# Patient Record
Sex: MALE | Race: WHITE | NOT HISPANIC OR LATINO | Employment: UNEMPLOYED | URBAN - METROPOLITAN AREA
[De-identification: names, ages, dates, MRNs, and addresses within clinical notes are randomized per-mention and may not be internally consistent; named-entity substitution may affect disease eponyms.]

---

## 2020-08-12 ENCOUNTER — OFFICE VISIT (OUTPATIENT)
Dept: INTERNAL MEDICINE | Facility: CLINIC | Age: 45
End: 2020-08-12
Payer: MEDICAID

## 2020-08-12 VITALS
HEART RATE: 92 BPM | TEMPERATURE: 98 F | HEIGHT: 71 IN | DIASTOLIC BLOOD PRESSURE: 79 MMHG | WEIGHT: 177.81 LBS | BODY MASS INDEX: 24.89 KG/M2 | SYSTOLIC BLOOD PRESSURE: 123 MMHG

## 2020-08-12 DIAGNOSIS — R53.81 MALAISE AND FATIGUE: ICD-10-CM

## 2020-08-12 DIAGNOSIS — G89.29 CHRONIC LEFT SHOULDER PAIN: Primary | ICD-10-CM

## 2020-08-12 DIAGNOSIS — F41.9 ANXIETY: ICD-10-CM

## 2020-08-12 DIAGNOSIS — N52.9 ERECTILE DYSFUNCTION, UNSPECIFIED ERECTILE DYSFUNCTION TYPE: ICD-10-CM

## 2020-08-12 DIAGNOSIS — E55.9 VITAMIN D DEFICIENCY: ICD-10-CM

## 2020-08-12 DIAGNOSIS — M54.9 UPPER BACK PAIN: ICD-10-CM

## 2020-08-12 DIAGNOSIS — R53.83 MALAISE AND FATIGUE: ICD-10-CM

## 2020-08-12 DIAGNOSIS — M25.512 CHRONIC LEFT SHOULDER PAIN: Primary | ICD-10-CM

## 2020-08-12 DIAGNOSIS — R63.4 WEIGHT LOSS: ICD-10-CM

## 2020-08-12 DIAGNOSIS — Z87.891 FORMER LIGHT TOBACCO SMOKER: ICD-10-CM

## 2020-08-12 DIAGNOSIS — Z00.00 ENCOUNTER FOR WELLNESS EXAMINATION IN ADULT: ICD-10-CM

## 2020-08-12 DIAGNOSIS — Z12.5 SPECIAL SCREENING, PROSTATE CANCER: ICD-10-CM

## 2020-08-12 PROBLEM — M79.604 PAIN IN BOTH LOWER EXTREMITIES: Status: ACTIVE | Noted: 2020-08-12

## 2020-08-12 PROBLEM — M79.605 PAIN IN BOTH LOWER EXTREMITIES: Status: ACTIVE | Noted: 2020-08-12

## 2020-08-12 PROCEDURE — 99204 OFFICE O/P NEW MOD 45 MIN: CPT | Mod: S$GLB,,, | Performed by: INTERNAL MEDICINE

## 2020-08-12 PROCEDURE — 99204 PR OFFICE/OUTPT VISIT, NEW, LEVL IV, 45-59 MIN: ICD-10-PCS | Mod: S$GLB,,, | Performed by: INTERNAL MEDICINE

## 2020-08-12 NOTE — PROGRESS NOTES
Chief C/o:    Headache (New patient) and Muscle Pain (Bilateral Legs x one mth, Bilateral Arms)        Health Care Maintenance    Health Maintenance       Date Due Completion Date    Hepatitis C Screening 1975 ---    Lipid Panel 1974 ---    HIV Screening 09/14/1989 ---    TETANUS VACCINE 09/14/1992 ---    Influenza Vaccine (1) 09/01/2020 ---                 HISTORY OF PRESENT ILLNESS:    PETER Houston is a 44 y.o. male who presents to the clinic today for Headache (New patient) and Muscle Pain (Bilateral Legs x one mth, Bilateral Arms)  .  Patient is coming to be established as a new patient.  He is telling me that he has no medical problem and no medical complaint till about January of this year.  He was working on his car repairing it in the cold weather with no other help, he was lifting a heavy object in the car while he was lying down under it for a prolonged period of time while he screwing some screws to fix something in the bottle the car, and after that he felt some mild pain in his neck and shoulders but was not too much.  After about 3 days patient started to have severe pain and numbness in the shoulders and neck and upper back, and he still have pain in the left shoulder in particular, and he feels is his power strength is decreasing there, and he is occasionally having numbness in the left side of the upper back and the left shoulder and left arm.  Patient also started to have pains in both his legs, mainly in the thighs and knees, with no recent trauma.  Patient so multiple doctors or have money treatments but it did not work, he was seen in the emergency room a couple of months ago at [facility] [place], and less than a month ago at [facility], he thinks that the are unable to diagnose him and know what is wrong with him.  Patient have some ED since he started to have his left shoulder and upper back pain i.e. about 5-6 months or more.  Patient feels that he is having a lot  "of anxiety at this time, because of the upper complain as well as his frustration that nobody seems to be able to diagnose him and treat him, to get rid of the symptoms.  Patient so far is not convinced that anybody saw him in the past was able from what is wrong with him.  He is not accepting that his symptoms could be simply because of anxiety.  He is feeling tired and fatigued, all his appetite is not as good as it was, and he is telling me that he lost about 10 kg over the past 7-8 months as well because of the loss of appetite.  He said he is to be about 90 kg and now he is about 80.              ALLERGIES AND MEDICATIONS: updated and reviewed.  Review of patient's allergies indicates:   Allergen Reactions    Acetaminophen Other (See Comments) and Palpitations     "feels like I was going to die"               CARE TEAM:    Patient Care Team:  Isidro Cuadra MD as PCP - General (Internal Medicine)         REVIEW OF SYSTEMS:    Review of Systems   Constitutional: Positive for fatigue and unexpected weight change (Mostly because of loss of appetite and not eating as well as before.). Negative for appetite change, chills, diaphoresis and fever.   HENT: Negative for congestion, drooling, ear discharge, ear pain, facial swelling, hearing loss, nosebleeds, rhinorrhea, sinus pain, sneezing, sore throat, tinnitus, trouble swallowing and voice change.    Eyes: Negative for pain, discharge, redness, itching and visual disturbance.   Respiratory: Negative for cough, choking, chest tightness, shortness of breath, wheezing and stridor.    Cardiovascular: Negative for chest pain, palpitations and leg swelling.   Gastrointestinal: Negative for abdominal distention, abdominal pain, blood in stool, constipation, diarrhea, nausea and vomiting.   Endocrine: Negative for cold intolerance, heat intolerance, polydipsia, polyphagia and polyuria.   Genitourinary: Negative for difficulty urinating, dysuria, flank pain, " "frequency, hematuria and urgency.   Musculoskeletal: Negative for arthralgias, back pain, gait problem, joint swelling, myalgias, neck pain and neck stiffness.   Skin: Negative for color change, pallor, rash and wound.   Allergic/Immunologic: Negative for environmental allergies, food allergies and immunocompromised state.   Neurological: Negative for dizziness, tremors, seizures, syncope, speech difficulty, weakness, light-headedness, numbness and headaches.   Hematological: Negative for adenopathy. Does not bruise/bleed easily.   Psychiatric/Behavioral: Negative for agitation, behavioral problems, confusion, decreased concentration, dysphoric mood, hallucinations, sleep disturbance and suicidal ideas. The patient is not nervous/anxious.          PHYSICAL EXAM:    Vitals:    08/12/20 1501   BP: 123/79   Pulse: 92   Temp: 97.9 °F (36.6 °C)     Weight: 80.7 kg (177 lb 12.8 oz)   Height: 5' 10.87" (180 cm)   Body mass index is 24.89 kg/m².  Vitals:    08/12/20 1501   BP: 123/79   Pulse: 92   Temp: 97.9 °F (36.6 °C)   Weight: 80.7 kg (177 lb 12.8 oz)   Height: 5' 10.87" (1.8 m)          Physical Exam   Constitutional: He is oriented to person, place, and time. He appears well-developed and well-nourished.  Non-toxic appearance. He does not appear ill. No distress.   Patient is anxious, comfortable, no gross distress, seems to be emotional, he showing signs of frustration as well.   HENT:   Head: Normocephalic and atraumatic.   Right Ear: Tympanic membrane, external ear and ear canal normal.   Left Ear: Tympanic membrane, external ear and ear canal normal.   Nose: Nose normal. No rhinorrhea or nasal congestion.   Mouth/Throat: Oropharynx is clear and moist. Mucous membranes are moist. No oropharyngeal exudate or posterior oropharyngeal erythema. Oropharynx is clear.   Eyes: Pupils are equal, round, and reactive to light. Conjunctivae are normal. Right eye exhibits no discharge. Left eye exhibits no discharge. No scleral " icterus.   Neck: Normal range of motion. Neck supple. No JVD present. No muscular tenderness present. No neck rigidity. No tracheal deviation present. No thyromegaly present.   Cardiovascular: Normal rate, regular rhythm, normal heart sounds and normal pulses. Exam reveals no gallop and no friction rub.   No murmur heard.  Pulmonary/Chest: Effort normal and breath sounds normal. No stridor. No respiratory distress. He has no wheezes. He has no rhonchi. He has no rales. He exhibits no tenderness.   Abdominal: Soft. Bowel sounds are normal. He exhibits no distension and no mass. There is no abdominal tenderness. There is no rebound and no guarding. No hernia.   Genitourinary:    Genitourinary Comments: No costovertebral angle tenderness.     Musculoskeletal: Normal range of motion.         General: Tenderness (Mild tenderness on palpating left upper back, and left shoulder.) present. No swelling, deformity or signs of injury.      Right lower leg: No edema.      Left lower leg: No edema.   Lymphadenopathy:     He has no cervical adenopathy.   Neurological: He is alert and oriented to person, place, and time. He displays no weakness and normal reflexes. No cranial nerve deficit or sensory deficit. He exhibits normal muscle tone. Coordination normal.   Skin: Skin is warm and dry. Capillary refill takes less than 2 seconds. No bruising, no lesion and no rash noted. He is not diaphoretic. No erythema. No jaundice or pallor.   Psychiatric: His behavior is normal. Mood, judgment and thought content normal.   Nursing note and vitals reviewed.         Labs:    February, had a visit to [facility], he had a CBC which was normal, CMP was normal except for blood sugar which was elevated to 118.  Was complaining of left shoulder pain and palpitation.      ASSESSMENT & PLAN:    1. Chronic left shoulder pain  - CREATININE, SERUM; Future  - Sedimentation rate; Future  - FERNANDO Screen w/Reflex; Future  - Rheumatoid  factor; Future  - CREATININE, SERUM  - Sedimentation rate  - FERNANDO Screen w/Reflex  - Rheumatoid factor    2. Upper back pain  - CREATININE, SERUM; Future  - Sedimentation rate; Future  - FERNANDO Screen w/Reflex; Future  - Rheumatoid factor; Future  - CREATININE, SERUM  - Sedimentation rate  - FERNANDO Screen w/Reflex  - Rheumatoid factor    3. Vitamin D deficiency  - Vitamin D; Future  - Vitamin D    4. Anxiety    5. Malaise and fatigue    6. Weight loss    7. Erectile dysfunction, unspecified erectile dysfunction type    8. BMI 24.0-24.9, adult    9. Encounter for wellness examination in adult  - CBC auto differential; Future  - Comprehensive metabolic panel; Future  - Lipid Panel; Future  - TSH; Future  - Hemoglobin A1C; Future  - CBC auto differential  - Comprehensive metabolic panel  - Lipid Panel  - TSH  - Hemoglobin A1C    10. Former light tobacco smoker    11. Special screening, prostate cancer  - Testosterone, free; Future  - Testosterone, free     Discussion:  Patient with history of musculoskeletal pains mainly in the upper back and left shoulder, and now some musculoskeletal pains in the lower extremities, and this seems to be aggravated by anxiety and frustration.  I tried to explain to the patient in simple words the nature of his complains, that his musculoskeletal pains are real, and that he has in addition to that anxiety and because of his frustration and a diagnosis to his satisfaction was not reached.  I explained him that I will start with blood testing, and I will see him after that and then will go from there.  I am not prescribing any medication for him at this time.    Orders Placed This Encounter   Procedures    CBC auto differential    Comprehensive metabolic panel    Lipid Panel    TSH    Hemoglobin A1C    Testosterone, free    Vitamin D    CREATININE, SERUM    Sedimentation rate    FERNANDO Screen w/Reflex    Rheumatoid factor      Follow up in about 10 days (around 8/22/2020). or sooner as  needed.    Patient was counseled and questions and concerns were addressed.    Please note:  Parts of this report were done using a dictation software, voice to text, and sometimes the text contains some uncorrected words or sentences that are missed during revision.

## 2020-08-17 LAB
25(OH)D3+25(OH)D2 SERPL-MCNC: 18.2 NG/ML (ref 30–100)
ALBUMIN SERPL-MCNC: 4.8 G/DL (ref 4–5)
ALBUMIN/GLOB SERPL: 2.1 {RATIO} (ref 1.2–2.2)
ALP SERPL-CCNC: 60 IU/L (ref 39–117)
ALT SERPL-CCNC: 17 IU/L (ref 0–44)
AST SERPL-CCNC: 12 IU/L (ref 0–40)
BASOPHILS # BLD AUTO: 0 X10E3/UL (ref 0–0.2)
BASOPHILS NFR BLD AUTO: 0 %
BILIRUB SERPL-MCNC: 0.9 MG/DL (ref 0–1.2)
BUN SERPL-MCNC: 16 MG/DL (ref 6–24)
BUN/CREAT SERPL: 16 (ref 9–20)
CALCIUM SERPL-MCNC: 9.7 MG/DL (ref 8.7–10.2)
CHLORIDE SERPL-SCNC: 103 MMOL/L (ref 96–106)
CHOLEST SERPL-MCNC: 181 MG/DL (ref 100–199)
CO2 SERPL-SCNC: 23 MMOL/L (ref 20–29)
CREAT SERPL-MCNC: 1 MG/DL (ref 0.76–1.27)
EOSINOPHIL # BLD AUTO: 0.1 X10E3/UL (ref 0–0.4)
EOSINOPHIL NFR BLD AUTO: 2 %
ERYTHROCYTE [DISTWIDTH] IN BLOOD BY AUTOMATED COUNT: 13.1 % (ref 11.6–15.4)
GLOBULIN SER CALC-MCNC: 2.3 G/DL (ref 1.5–4.5)
GLUCOSE SERPL-MCNC: 100 MG/DL (ref 65–99)
HBA1C MFR BLD: 5.5 % (ref 4.8–5.6)
HCT VFR BLD AUTO: 43.8 % (ref 37.5–51)
HDLC SERPL-MCNC: 45 MG/DL
HGB BLD-MCNC: 14.6 G/DL (ref 13–17.7)
IMM GRANULOCYTES # BLD AUTO: 0 X10E3/UL (ref 0–0.1)
IMM GRANULOCYTES NFR BLD AUTO: 0 %
INTERPRETATION: NORMAL
LDLC SERPL CALC-MCNC: 117 MG/DL (ref 0–99)
LYMPHOCYTES # BLD AUTO: 1.2 X10E3/UL (ref 0.7–3.1)
LYMPHOCYTES NFR BLD AUTO: 27 %
MCH RBC QN AUTO: 27.9 PG (ref 26.6–33)
MCHC RBC AUTO-ENTMCNC: 33.3 G/DL (ref 31.5–35.7)
MCV RBC AUTO: 84 FL (ref 79–97)
MONOCYTES # BLD AUTO: 0.3 X10E3/UL (ref 0.1–0.9)
MONOCYTES NFR BLD AUTO: 6 %
NEUTROPHILS # BLD AUTO: 2.9 X10E3/UL (ref 1.4–7)
NEUTROPHILS NFR BLD AUTO: 65 %
PLATELET # BLD AUTO: 292 X10E3/UL (ref 150–450)
POTASSIUM SERPL-SCNC: 3.7 MMOL/L (ref 3.5–5.2)
PROT SERPL-MCNC: 7.1 G/DL (ref 6–8.5)
RBC # BLD AUTO: 5.23 X10E6/UL (ref 4.14–5.8)
RHEUMATOID FACT SERPL-ACNC: <10 IU/ML (ref 0–13.9)
SODIUM SERPL-SCNC: 141 MMOL/L (ref 134–144)
TESTOST FREE SERPL-MCNC: 6 PG/ML (ref 6.8–21.5)
TESTOST SERPL-MCNC: 233 NG/DL (ref 264–916)
TRIGL SERPL-MCNC: 96 MG/DL (ref 0–149)
TSH SERPL DL<=0.005 MIU/L-ACNC: 2.44 UIU/ML (ref 0.45–4.5)
VLDLC SERPL CALC-MCNC: 19 MG/DL (ref 5–40)
WBC # BLD AUTO: 4.5 X10E3/UL (ref 3.4–10.8)

## 2020-08-19 ENCOUNTER — OFFICE VISIT (OUTPATIENT)
Dept: INTERNAL MEDICINE | Facility: CLINIC | Age: 45
End: 2020-08-19
Payer: MEDICAID

## 2020-08-19 VITALS
SYSTOLIC BLOOD PRESSURE: 112 MMHG | HEIGHT: 70 IN | WEIGHT: 176.5 LBS | TEMPERATURE: 98 F | HEART RATE: 91 BPM | BODY MASS INDEX: 25.27 KG/M2 | DIASTOLIC BLOOD PRESSURE: 63 MMHG

## 2020-08-19 DIAGNOSIS — E55.9 VITAMIN D DEFICIENCY: ICD-10-CM

## 2020-08-19 DIAGNOSIS — E78.00 PURE HYPERCHOLESTEROLEMIA: Primary | ICD-10-CM

## 2020-08-19 DIAGNOSIS — M54.9 UPPER BACK PAIN: ICD-10-CM

## 2020-08-19 DIAGNOSIS — R53.83 MALAISE AND FATIGUE: ICD-10-CM

## 2020-08-19 DIAGNOSIS — R73.03 PREDIABETES: ICD-10-CM

## 2020-08-19 DIAGNOSIS — R53.81 MALAISE AND FATIGUE: ICD-10-CM

## 2020-08-19 DIAGNOSIS — F41.9 ANXIETY: ICD-10-CM

## 2020-08-19 DIAGNOSIS — E29.1 HYPOGONADISM IN MALE: ICD-10-CM

## 2020-08-19 PROCEDURE — 99213 OFFICE O/P EST LOW 20 MIN: CPT | Mod: S$GLB,,, | Performed by: INTERNAL MEDICINE

## 2020-08-19 PROCEDURE — 99213 PR OFFICE/OUTPT VISIT, EST, LEVL III, 20-29 MIN: ICD-10-PCS | Mod: S$GLB,,, | Performed by: INTERNAL MEDICINE

## 2020-08-19 RX ORDER — TESTOSTERONE CYPIONATE 200 MG/ML
200 INJECTION, SOLUTION INTRAMUSCULAR
Qty: 6 ML | Refills: 3 | Status: SHIPPED | OUTPATIENT
Start: 2020-08-19 | End: 2021-02-17

## 2020-08-19 RX ORDER — AMITRIPTYLINE HYDROCHLORIDE 25 MG/1
25 TABLET, FILM COATED ORAL NIGHTLY PRN
Qty: 90 TABLET | Refills: 3 | Status: SHIPPED | OUTPATIENT
Start: 2020-08-19 | End: 2021-08-19

## 2020-08-19 RX ORDER — ERGOCALCIFEROL 1.25 MG/1
50000 CAPSULE ORAL
Qty: 12 CAPSULE | Refills: 3 | Status: SHIPPED | OUTPATIENT
Start: 2020-08-19

## 2020-08-19 NOTE — PROGRESS NOTES
"Chief C/o:    Anxiety (Lab results check.) and Pain (Pt. c/o pain in neck, (B) arms, shoulders, (B) legs and upper back.)        Health Care Maintenance    Health Maintenance       Date Due Completion Date    Hepatitis C Screening 1974 ---    HIV Screening 09/14/1989 ---    TETANUS VACCINE 09/14/1992 ---    Influenza Vaccine (1) 09/01/2020 ---    Lipid Panel 08/14/2025 8/14/2020                 HISTORY OF PRESENT ILLNESS:    PETER Houston is a 44 y.o. male who presents to the clinic today for Anxiety (Lab results check.) and Pain (Pt. c/o pain in neck, (B) arms, shoulders, (B) legs and upper back.)  .                   ALLERGIES AND MEDICATIONS: updated and reviewed.  Review of patient's allergies indicates:   Allergen Reactions    Acetaminophen Other (See Comments) and Palpitations     "feels like I was going to die"               CARE TEAM:    Patient Care Team:  Isidro Cuadra MD as PCP - General (Internal Medicine)         REVIEW OF SYSTEMS:    Review of Systems      PHYSICAL EXAM:    Vitals:    08/19/20 1429   BP: 112/63   Pulse: 91   Temp: 97.6 °F (36.4 °C)     Weight: 80 kg (176 lb 7.7 oz)   Height: 5' 10" (177.8 cm)   Body mass index is 25.32 kg/m².  Vitals:    08/19/20 1429   BP: 112/63   Pulse: 91   Temp: 97.6 °F (36.4 °C)   TempSrc: Temporal   Weight: 80 kg (176 lb 7.7 oz)   Height: 5' 10" (1.778 m)   PainSc:   5   PainLoc: Leg          Physical Exam       Labs:    Lab Results   Component Value Date     (H) 08/14/2020     08/14/2020    K 3.7 08/14/2020     08/14/2020    CO2 23 08/14/2020    BUN 16 08/14/2020    CREATININE 1.00 08/14/2020    CALCIUM 9.7 08/14/2020    ALBUMIN 4.8 08/14/2020    BILITOT 0.9 08/14/2020    AST 12 08/14/2020    ALT 17 08/14/2020    EGFRNONAA 90 08/14/2020     Lab Results   Component Value Date    WBC 4.5 08/14/2020    RBC 5.23 08/14/2020    HGB 14.6 08/14/2020    HCT 43.8 08/14/2020    MCV 84 08/14/2020    RDW 13.1 08/14/2020    PLT " 292 08/14/2020      Lab Results   Component Value Date    CHOL 181 08/14/2020    TRIG 96 08/14/2020    HDL 45 08/14/2020    LDLCALC 117 (H) 08/14/2020     Lab Results   Component Value Date    TSH 2.440 08/14/2020     Lab Results   Component Value Date    HGBA1C 5.5 08/14/2020          ASSESSMENT & PLAN:    1. Pure hypercholesterolemia    2. Prediabetes, mild    3. Vitamin D deficiency  - ergocalciferol (ERGOCALCIFEROL) 50,000 unit Cap; Take 1 capsule (50,000 Units total) by mouth every 7 days.  Dispense: 12 capsule; Refill: 3    4. Hypogonadism in male  - testosterone cypionate (DEPOTESTOTERONE CYPIONATE) 200 mg/mL injection; Inject 1 mL (200 mg total) into the muscle every 14 (fourteen) days.  Dispense: 6 mL; Refill: 3    5. Upper back pain    6. Malaise and fatigue  - testosterone cypionate (DEPOTESTOTERONE CYPIONATE) 200 mg/mL injection; Inject 1 mL (200 mg total) into the muscle every 14 (fourteen) days.  Dispense: 6 mL; Refill: 3    7. Anxiety  - amitriptyline (ELAVIL) 25 MG tablet; Take 1 tablet (25 mg total) by mouth nightly as needed for Insomnia.  Dispense: 90 tablet; Refill: 3    8. BMI 25.0-25.9,adult     Patient's have mild prediabetes as well as mild pure hypercholesterolemia, diet and exercise and weight loss were encouraged, he is having anxiety and hypogonadism with the ED, amitriptyline was prescribed as well as testosterone cypionate and observe.  Patient is traveling and he will contact us once he come back, he may ST overseas for several months, he is planning to travel whenever he can as at the current time travel is not possible toe the country he is wishing to try to, because of COVID-19 epidemic.        No orders of the defined types were placed in this encounter.     No follow-ups on file. or sooner as needed.    Patient was counseled and questions and concerns were addressed.    Please note:  Parts of this report were done using a dictation software, voice to text, and sometimes the text  contains some uncorrected words or sentences that are missed during revision.

## 2020-08-20 PROBLEM — E29.1 HYPOGONADISM IN MALE: Status: ACTIVE | Noted: 2020-08-20

## 2020-08-20 PROBLEM — R73.03 PREDIABETES: Status: ACTIVE | Noted: 2020-08-20

## 2020-08-20 PROBLEM — E55.9 VITAMIN D DEFICIENCY: Status: ACTIVE | Noted: 2020-08-20

## 2020-08-20 PROBLEM — E78.00 PURE HYPERCHOLESTEROLEMIA: Status: ACTIVE | Noted: 2020-08-20

## 2021-03-31 DIAGNOSIS — Z11.59 NEED FOR HEPATITIS C SCREENING TEST: ICD-10-CM
